# Patient Record
Sex: MALE | Race: WHITE | NOT HISPANIC OR LATINO | Employment: FULL TIME | URBAN - METROPOLITAN AREA
[De-identification: names, ages, dates, MRNs, and addresses within clinical notes are randomized per-mention and may not be internally consistent; named-entity substitution may affect disease eponyms.]

---

## 2017-03-09 PROBLEM — N52.9 IMPOTENCE DUE TO ERECTILE DYSFUNCTION: Status: ACTIVE | Noted: 2017-03-09

## 2017-03-09 PROBLEM — E66.811 OBESITY (BMI 30.0-34.9): Status: ACTIVE | Noted: 2017-03-09

## 2017-03-09 PROBLEM — E66.9 OBESITY (BMI 30.0-34.9): Status: ACTIVE | Noted: 2017-03-09

## 2017-03-09 PROBLEM — Z00.00 HEALTH CARE MAINTENANCE: Status: ACTIVE | Noted: 2017-03-09

## 2017-06-12 PROBLEM — Z00.00 HEALTH CARE MAINTENANCE: Status: RESOLVED | Noted: 2017-03-09 | Resolved: 2017-06-12

## 2017-07-13 PROBLEM — Z00.00 ROUTINE HEALTH MAINTENANCE: Status: ACTIVE | Noted: 2017-07-13

## 2017-10-16 PROBLEM — Z00.00 ROUTINE HEALTH MAINTENANCE: Status: RESOLVED | Noted: 2017-07-13 | Resolved: 2017-10-16

## 2018-01-25 PROBLEM — D12.6 TUBULAR ADENOMA OF COLON: Status: ACTIVE | Noted: 2018-01-25

## 2018-04-30 PROBLEM — Z00.00 HEALTH CARE MAINTENANCE: Status: RESOLVED | Noted: 2017-03-09 | Resolved: 2018-04-30

## 2018-10-22 PROBLEM — S92.312A CLOSED DISPLACED FRACTURE OF FIRST METATARSAL BONE OF LEFT FOOT: Status: ACTIVE | Noted: 2018-10-22

## 2019-02-28 PROBLEM — E66.811 OBESITY (BMI 30.0-34.9): Status: RESOLVED | Noted: 2017-03-09 | Resolved: 2019-02-28

## 2019-02-28 PROBLEM — E66.9 OBESITY (BMI 30.0-34.9): Status: RESOLVED | Noted: 2017-03-09 | Resolved: 2019-02-28

## 2019-06-03 PROBLEM — Z00.00 HEALTHCARE MAINTENANCE: Status: RESOLVED | Noted: 2017-07-13 | Resolved: 2019-06-03

## 2019-07-15 PROBLEM — Z86.0100 HISTORY OF COLON POLYPS: Status: ACTIVE | Noted: 2019-07-15

## 2019-07-15 PROBLEM — Z86.010 HISTORY OF COLON POLYPS: Status: ACTIVE | Noted: 2019-07-15

## 2019-09-06 PROBLEM — Z28.21 INFLUENZA VACCINE REFUSED: Status: ACTIVE | Noted: 2019-09-06

## 2019-09-06 PROBLEM — R80.9 TYPE 2 DIABETES MELLITUS WITH PROTEINURIA: Status: ACTIVE | Noted: 2019-09-06

## 2019-09-06 PROBLEM — E55.9 VITAMIN D DEFICIENCY: Status: ACTIVE | Noted: 2019-09-06

## 2019-09-06 PROBLEM — E11.29 TYPE 2 DIABETES MELLITUS WITH PROTEINURIA: Status: ACTIVE | Noted: 2019-09-06

## 2019-09-06 PROBLEM — E11.9 TYPE 2 DIABETES MELLITUS WITHOUT OPHTHALMIC MANIFESTATIONS: Status: ACTIVE | Noted: 2019-09-06

## 2019-10-16 PROBLEM — M65.4 DE QUERVAIN'S TENOSYNOVITIS, LEFT: Status: ACTIVE | Noted: 2019-10-16

## 2019-12-09 PROBLEM — Z00.00 HEALTHCARE MAINTENANCE: Status: RESOLVED | Noted: 2017-07-13 | Resolved: 2019-12-09

## 2021-05-06 ENCOUNTER — PATIENT MESSAGE (OUTPATIENT)
Dept: RESEARCH | Facility: HOSPITAL | Age: 56
End: 2021-05-06

## 2022-03-31 PROBLEM — J06.9 URI (UPPER RESPIRATORY INFECTION): Status: ACTIVE | Noted: 2022-03-31

## 2022-06-22 ENCOUNTER — PATIENT OUTREACH (OUTPATIENT)
Dept: ADMINISTRATIVE | Facility: HOSPITAL | Age: 57
End: 2022-06-22

## 2022-06-22 NOTE — PROGRESS NOTES
Attempted to contact pt in reference to overdue DM eye exam. Unable to contact. No answer no voicemail setup.

## 2022-10-19 DIAGNOSIS — E11.9 TYPE 2 DIABETES MELLITUS WITHOUT COMPLICATION: ICD-10-CM

## 2023-01-27 ENCOUNTER — NURSE TRIAGE (OUTPATIENT)
Dept: ADMINISTRATIVE | Facility: CLINIC | Age: 58
End: 2023-01-27

## 2023-01-27 NOTE — TELEPHONE ENCOUNTER
Pt states continuing to have right side chest pain with pain radiating from shoulder to elbow. Pt states was seen in ER on 1/18 for chest pain but pain has moved to elbow now. Pt states 6/10 pain to chest, elbow, and shoulder. Per protocol, call  now. Advised pt to be evaluated. Pt verbalizes understanding and advised to callback for any further questions or concerns.   Reason for Disposition   Chest pain lasting longer than 5 minutes and ANY of the following:* Over 44 years old* Over 30 years old and at least one cardiac risk factor (e.g., diabetes mellitus, high blood pressure, high cholesterol, smoker, or strong family history of heart disease)* History of heart disease (i.e., angina, heart attack, heart failure, bypass surgery, takes nitroglycerin)* Pain is crushing, pressure-like, or heavy    Additional Information   Negative: SEVERE difficulty breathing (e.g., struggling for each breath, speaks in single words)   Negative: Passed out (i.e., fainted, collapsed and was not responding)   Negative: Difficult to awaken or acting confused (e.g., disoriented, slurred speech)   Negative: Shock suspected (e.g., cold/pale/clammy skin, too weak to stand, low BP, rapid pulse)    Protocols used: Chest Pain-A-[facility]

## 2023-01-27 NOTE — TELEPHONE ENCOUNTER
Contacted pt, verified name and . Pt states he is continuing to have right side chest pain with pain radiating from shoulder to elbow. Pt states was seen in ER on  for chest pain but pain has moved to elbow now. Pt states 6/10 pain to chest, elbow, and shoulder. Pt instructed to call 911 now. Pt v/u.

## 2023-05-08 PROBLEM — M25.562 LEFT KNEE PAIN: Status: ACTIVE | Noted: 2023-05-08

## 2024-10-11 ENCOUNTER — PATIENT MESSAGE (OUTPATIENT)
Dept: ADMINISTRATIVE | Facility: HOSPITAL | Age: 59
End: 2024-10-11

## 2024-11-26 ENCOUNTER — PATIENT MESSAGE (OUTPATIENT)
Dept: ADMINISTRATIVE | Facility: HOSPITAL | Age: 59
End: 2024-11-26

## 2024-11-26 ENCOUNTER — PATIENT OUTREACH (OUTPATIENT)
Dept: ADMINISTRATIVE | Facility: HOSPITAL | Age: 59
End: 2024-11-26

## 2024-11-26 VITALS — SYSTOLIC BLOOD PRESSURE: 115 MMHG | DIASTOLIC BLOOD PRESSURE: 67 MMHG

## 2025-05-21 ENCOUNTER — PATIENT MESSAGE (OUTPATIENT)
Dept: ADMINISTRATIVE | Facility: OTHER | Age: 60
End: 2025-05-21